# Patient Record
Sex: FEMALE | Race: WHITE | URBAN - METROPOLITAN AREA
[De-identification: names, ages, dates, MRNs, and addresses within clinical notes are randomized per-mention and may not be internally consistent; named-entity substitution may affect disease eponyms.]

---

## 2020-03-12 ENCOUNTER — OFFICE VISIT (OUTPATIENT)
Dept: FAMILY MEDICINE CLINIC | Facility: CLINIC | Age: 30
End: 2020-03-12
Payer: COMMERCIAL

## 2020-03-12 VITALS
RESPIRATION RATE: 16 BRPM | HEIGHT: 66 IN | WEIGHT: 141 LBS | BODY MASS INDEX: 22.66 KG/M2 | SYSTOLIC BLOOD PRESSURE: 122 MMHG | HEART RATE: 89 BPM | OXYGEN SATURATION: 100 % | DIASTOLIC BLOOD PRESSURE: 80 MMHG | TEMPERATURE: 98 F

## 2020-03-12 DIAGNOSIS — Z13.220 LIPID SCREENING: ICD-10-CM

## 2020-03-12 DIAGNOSIS — Z13.1 DIABETES MELLITUS SCREENING: ICD-10-CM

## 2020-03-12 DIAGNOSIS — Z13.0 SCREENING FOR DEFICIENCY ANEMIA: ICD-10-CM

## 2020-03-12 DIAGNOSIS — Z00.00 WELLNESS EXAMINATION: Primary | ICD-10-CM

## 2020-03-12 DIAGNOSIS — Z13.29 THYROID DISORDER SCREEN: ICD-10-CM

## 2020-03-12 PROCEDURE — 99385 PREV VISIT NEW AGE 18-39: CPT | Performed by: FAMILY MEDICINE

## 2020-03-12 NOTE — PROGRESS NOTES
[address]    Chief Complaint:   Patient presents with:  Physical: New Patient/General      HPI:     Leatha Rosales is a 26year old female who presents for an Annual Health Visit. Patient does not have any concerns today.   She bleeding  ENDOCRINE: denies excessive thirst or urination; denies unexpected wt gain or wt loss      EXAM:   /80   Pulse 89   Temp 98 °F (36.7 °C) (Tympanic)   Resp 16   Ht 66\"   Wt 141 lb (64 kg)   LMP 02/24/2020 (Within Days)   SpO2 100%   BMI 22. labs at [facility].   Check records for Tdap booster. Return to clinic if any concern. The patient indicates understanding of these issues and agrees to the plan. Problem List:  There is no problem list on file for this patient.       Vasiliy Ramirez,

## 2020-03-12 NOTE — PATIENT INSTRUCTIONS
Normal exam today. Will see gynecologist for pap smear. Recommend fasting labs at [facility].   Check records for Tdap booster. Return to clinic if any concern.

## 2020-03-14 LAB
ABSOLUTE BASOPHILS: 18 CELLS/UL (ref 0–200)
ABSOLUTE EOSINOPHILS: 62 CELLS/UL (ref 15–500)
ABSOLUTE LYMPHOCYTES: 1771 CELLS/UL (ref 850–3900)
ABSOLUTE MONOCYTES: 392 CELLS/UL (ref 200–950)
ABSOLUTE NEUTROPHILS: 6657 CELLS/UL (ref 1500–7800)
ALBUMIN/GLOBULIN RATIO: 1.4 (CALC) (ref 1–2.5)
ALBUMIN: 4 G/DL (ref 3.6–5.1)
ALKALINE PHOSPHATASE: 37 U/L (ref 31–125)
ALT: 10 U/L (ref 6–29)
APPEARANCE: CLEAR
AST: 12 U/L (ref 10–30)
BASOPHILS: 0.2 %
BILIRUBIN, TOTAL: 0.5 MG/DL (ref 0.2–1.2)
BILIRUBIN: NEGATIVE
BUN: 10 MG/DL (ref 7–25)
CALCIUM: 9.2 MG/DL (ref 8.6–10.2)
CARBON DIOXIDE: 25 MMOL/L (ref 20–32)
CHLORIDE: 106 MMOL/L (ref 98–110)
CHOL/HDLC RATIO: 1.9 (CALC)
CHOLESTEROL, TOTAL: 155 MG/DL
COLOR: YELLOW
CREATININE: 0.79 MG/DL (ref 0.5–1.1)
EGFR IF AFRICN AM: 116 ML/MIN/1.73M2
EGFR IF NONAFRICN AM: 100 ML/MIN/1.73M2
EOSINOPHILS: 0.7 %
GLOBULIN: 2.8 G/DL (CALC) (ref 1.9–3.7)
GLUCOSE: 79 MG/DL (ref 65–99)
GLUCOSE: NEGATIVE
HDL CHOLESTEROL: 83 MG/DL
HEMATOCRIT: 37.2 % (ref 35–45)
HEMOGLOBIN A1C: 5.3 % OF TOTAL HGB
HEMOGLOBIN: 12.7 G/DL (ref 11.7–15.5)
KETONES: NEGATIVE
LDL-CHOLESTEROL: 55 MG/DL (CALC)
LEUKOCYTE ESTERASE: NEGATIVE
LYMPHOCYTES: 19.9 %
MCH: 29.8 PG (ref 27–33)
MCHC: 34.1 G/DL (ref 32–36)
MCV: 87.3 FL (ref 80–100)
MONOCYTES: 4.4 %
MPV: 9.8 FL (ref 7.5–12.5)
NEUTROPHILS: 74.8 %
NITRITE: NEGATIVE
NON-HDL CHOLESTEROL: 72 MG/DL (CALC)
OCCULT BLOOD: NEGATIVE
PH: 5.5 (ref 5–8)
PLATELET COUNT: 324 THOUSAND/UL (ref 140–400)
POTASSIUM: 5 MMOL/L (ref 3.5–5.3)
PROTEIN, TOTAL: 6.8 G/DL (ref 6.1–8.1)
PROTEIN: NEGATIVE
RDW: 12.8 % (ref 11–15)
RED BLOOD CELL COUNT: 4.26 MILLION/UL (ref 3.8–5.1)
SODIUM: 138 MMOL/L (ref 135–146)
SPECIFIC GRAVITY: 1.02 (ref 1–1.03)
TRIGLYCERIDES: 87 MG/DL
TSH W/REFLEX TO FT4: 0.55 MIU/L
WHITE BLOOD CELL COUNT: 8.9 THOUSAND/UL (ref 3.8–10.8)

## 2020-03-16 ENCOUNTER — TELEPHONE (OUTPATIENT)
Dept: FAMILY MEDICINE CLINIC | Facility: CLINIC | Age: 30
End: 2020-03-16

## 2020-03-16 NOTE — TELEPHONE ENCOUNTER
----- Message from Shalonda Matson MD sent at 3/14/2020 11:38 AM CDT -----  Please inform patient that all her labs are very good. Recommend to continue with healthy diet and exercise.

## 2021-09-08 ENCOUNTER — OFFICE VISIT (OUTPATIENT)
Dept: FAMILY MEDICINE CLINIC | Facility: CLINIC | Age: 31
End: 2021-09-08
Payer: COMMERCIAL

## 2021-09-08 VITALS
OXYGEN SATURATION: 100 % | SYSTOLIC BLOOD PRESSURE: 170 MMHG | RESPIRATION RATE: 16 BRPM | BODY MASS INDEX: 25.45 KG/M2 | WEIGHT: 158.38 LBS | HEART RATE: 105 BPM | DIASTOLIC BLOOD PRESSURE: 92 MMHG | HEIGHT: 66 IN | TEMPERATURE: 98 F

## 2021-09-08 DIAGNOSIS — M25.561 CHRONIC PAIN OF RIGHT KNEE: ICD-10-CM

## 2021-09-08 DIAGNOSIS — G89.29 CHRONIC PAIN OF RIGHT KNEE: ICD-10-CM

## 2021-09-08 DIAGNOSIS — I10 HYPERTENSION, UNSPECIFIED TYPE: Primary | ICD-10-CM

## 2021-09-08 PROCEDURE — 93000 ELECTROCARDIOGRAM COMPLETE: CPT | Performed by: NURSE PRACTITIONER

## 2021-09-08 PROCEDURE — 3077F SYST BP >= 140 MM HG: CPT | Performed by: NURSE PRACTITIONER

## 2021-09-08 PROCEDURE — 99214 OFFICE O/P EST MOD 30 MIN: CPT | Performed by: NURSE PRACTITIONER

## 2021-09-08 PROCEDURE — 3008F BODY MASS INDEX DOCD: CPT | Performed by: NURSE PRACTITIONER

## 2021-09-08 PROCEDURE — 3080F DIAST BP >= 90 MM HG: CPT | Performed by: NURSE PRACTITIONER

## 2021-09-08 RX ORDER — LISINOPRIL 10 MG/1
10 TABLET ORAL DAILY
Qty: 90 TABLET | Refills: 0 | Status: SHIPPED | OUTPATIENT
Start: 2021-09-08 | End: 2021-12-08

## 2021-09-08 NOTE — PATIENT INSTRUCTIONS
Blood pressure:  Check labs today through [facility]  EKG today.   DASH or [employer] dietary changes  Start lisinopril 10mg once a day  Notify the office if any potential of pregnancy arises, medication should be avoided, would need to change therapy      Ri itching, swelling, or severe dizziness. Do not use the medication any more than instructed. This medicine may cause dizziness or fainting, especially after exercising or in hot weather. Be very careful when standing or sitting up quickly.   Your ability t there is to know about it. Your doctor or pharmacist may give you other documents about your medicine. Please talk to them if you have any questions. Always follow their advice. There is a more complete description of this medicine available in [place]. Scan

## 2021-09-08 NOTE — PROGRESS NOTES
[address]  PROGRESS NOTE  Chief Complaint:   Patient presents with:  Blood Pressure  Knee Pain: Right knee. possible knee injury from working out.  cant put weight it for a long period of times       HPI:   This is a 31year old female e Allergies:  No Known Allergies  Current Meds:  Current Outpatient Medications   Medication Sig Dispense Refill   • lisinopril 10 MG Oral Tab Take 1 tablet (10 mg total) by mouth daily.  90 tablet 0      Counseling given: Not Answered         REVIEW OF SYSTE rales/rhonchi/wheezing. HEART:  Regular rate and rhythm, S1 and S2 are normal, no murmurs, rubs or gallops. EXTREMITIES: No edema, no cyanosis, no clubbing, FROM, 2+ dorsalis pedis pulses bilaterally.   Positive intermittent right knee pain with extension Tab; Take 1 tablet (10 mg total) by mouth daily. Chronic pain of right knee  Aggressive conservative treatments at this time, avoid deep squats. May take Tylenol arthritis on days of working as she is on her feet often during the day.   Ice right knee a take the missed dose. Return to your normal dosing schedule. Do not take 2 doses of this medicine at one time. Please tell your doctor and pharmacist about all the medicines you take. Include both prescription and over-the-counter medicines.  Also tell the effects:  · low blood pressure  A few people may have an allergic reactions to this medicine. Symptoms can include difficulty breathing, skin rash, itching, swelling, or severe dizziness. If you notice any of these symptoms, seek medical help quickly.   Ext

## 2021-09-09 ENCOUNTER — TELEPHONE (OUTPATIENT)
Dept: FAMILY MEDICINE CLINIC | Facility: CLINIC | Age: 31
End: 2021-09-09

## 2021-09-09 NOTE — TELEPHONE ENCOUNTER
Called to Bianka Bowden with [facility]. TSH and Free T4 added to labs. Will await results before informing patient of the below.

## 2021-09-09 NOTE — TELEPHONE ENCOUNTER
----- Message from JOI Ortiz sent at 9/9/2021 10:52 AM CDT -----  Please call [facility] and ask were TSH and free T4 is, was also in lab orders yesterday. CBC stable without acute findings. Magnesium normal range.   Metabolic panel stable with exce

## 2021-09-10 LAB
ABSOLUTE BASOPHILS: 27 CELLS/UL (ref 0–200)
ABSOLUTE EOSINOPHILS: 102 CELLS/UL (ref 15–500)
ABSOLUTE LYMPHOCYTES: 2292 CELLS/UL (ref 850–3900)
ABSOLUTE MONOCYTES: 551 CELLS/UL (ref 200–950)
ABSOLUTE NEUTROPHILS: 3828 CELLS/UL (ref 1500–7800)
ALBUMIN/GLOBULIN RATIO: 1.7 (CALC) (ref 1–2.5)
ALBUMIN: 4.7 G/DL (ref 3.6–5.1)
ALKALINE PHOSPHATASE: 55 U/L (ref 31–125)
ALT: 15 U/L (ref 6–29)
AST: 18 U/L (ref 10–30)
BASOPHILS: 0.4 %
BILIRUBIN, TOTAL: 0.5 MG/DL (ref 0.2–1.2)
BUN: 11 MG/DL (ref 7–25)
CALCIUM: 9.6 MG/DL (ref 8.6–10.2)
CARBON DIOXIDE: 29 MMOL/L (ref 20–32)
CHLORIDE: 102 MMOL/L (ref 98–110)
CREATININE: 0.77 MG/DL (ref 0.5–1.1)
EGFR IF AFRICN AM: 119 ML/MIN/1.73M2
EGFR IF NONAFRICN AM: 103 ML/MIN/1.73M2
EOSINOPHILS: 1.5 %
GLOBULIN: 2.7 G/DL (CALC) (ref 1.9–3.7)
GLUCOSE: 47 MG/DL (ref 65–139)
HEMATOCRIT: 39.7 % (ref 35–45)
HEMOGLOBIN: 13.4 G/DL (ref 11.7–15.5)
LYMPHOCYTES: 33.7 %
MAGNESIUM: 2.1 MG/DL (ref 1.5–2.5)
MCH: 29.1 PG (ref 27–33)
MCHC: 33.8 G/DL (ref 32–36)
MCV: 86.3 FL (ref 80–100)
MONOCYTES: 8.1 %
MPV: 9.3 FL (ref 7.5–12.5)
NEUTROPHILS: 56.3 %
PLATELET COUNT: 265 THOUSAND/UL (ref 140–400)
POTASSIUM: 5.1 MMOL/L (ref 3.5–5.3)
PROTEIN, TOTAL: 7.4 G/DL (ref 6.1–8.1)
RDW: 12.6 % (ref 11–15)
RED BLOOD CELL COUNT: 4.6 MILLION/UL (ref 3.8–5.1)
SODIUM: 140 MMOL/L (ref 135–146)
T4, FREE: 1.1 NG/DL (ref 0.8–1.8)
TSH: 0.58 MIU/L
WHITE BLOOD CELL COUNT: 6.8 THOUSAND/UL (ref 3.8–10.8)

## 2021-09-10 NOTE — TELEPHONE ENCOUNTER
See notes attached to TSH and Free T4 labs. Per Rajni Beyer:    \"Normal low level of TSH, normal free T4. Monitor annually at wellness exams. \"    [employer] message sent along with results as below.

## 2021-11-27 DIAGNOSIS — I10 HYPERTENSION, UNSPECIFIED TYPE: ICD-10-CM

## 2021-11-29 NOTE — TELEPHONE ENCOUNTER
Future appt:     Last Appointment with provider:   9/8/2021; Return in about 4 weeks (around 10/6/2021) for blood pressure check.     Last appointment at [facility] [place]:  9/8/2021  CHOLESTEROL, TOTAL (mg/dL)   Date Value   03/13/2020 155     HDL CHOLESTEROL (

## 2021-12-08 RX ORDER — LISINOPRIL 10 MG/1
TABLET ORAL
Qty: 30 TABLET | Refills: 0 | Status: SHIPPED | OUTPATIENT
Start: 2021-12-08 | End: 2022-01-05

## 2021-12-15 ENCOUNTER — OFFICE VISIT (OUTPATIENT)
Dept: FAMILY MEDICINE CLINIC | Facility: CLINIC | Age: 31
End: 2021-12-15
Payer: COMMERCIAL

## 2021-12-15 VITALS
WEIGHT: 158 LBS | HEART RATE: 84 BPM | SYSTOLIC BLOOD PRESSURE: 136 MMHG | HEIGHT: 66 IN | TEMPERATURE: 99 F | RESPIRATION RATE: 14 BRPM | BODY MASS INDEX: 25.39 KG/M2 | OXYGEN SATURATION: 100 % | DIASTOLIC BLOOD PRESSURE: 90 MMHG

## 2021-12-15 DIAGNOSIS — I10 HYPERTENSION, UNSPECIFIED TYPE: Primary | ICD-10-CM

## 2021-12-15 PROCEDURE — 3075F SYST BP GE 130 - 139MM HG: CPT | Performed by: NURSE PRACTITIONER

## 2021-12-15 PROCEDURE — 3008F BODY MASS INDEX DOCD: CPT | Performed by: NURSE PRACTITIONER

## 2021-12-15 PROCEDURE — 99214 OFFICE O/P EST MOD 30 MIN: CPT | Performed by: NURSE PRACTITIONER

## 2021-12-15 PROCEDURE — 3080F DIAST BP >= 90 MM HG: CPT | Performed by: NURSE PRACTITIONER

## 2021-12-15 NOTE — PROGRESS NOTES
[address]  PROGRESS NOTE  Chief Complaint:   Patient presents with: Follow - Up  Hypertension      HPI:   This is a 31year old female hypertension recheck.     Patient seen in September, was started on lisinopril secondary to hypertens blurred vision, double vision or yellow sclerae. INTEGUMENTARY:  Denies rashes, itching, skin lesion, or excessive skin dryness.   CARDIOVASCULAR:  Denies chest pain, chest pressure, chest discomfort, palpitations, edema, dyspnea on exertion or at rest. x 3; affect appropriate, no depressed mood or anxiety      ASSESSMENT AND PLAN:   Niyah Sorenson was seen today for follow - up and hypertension.     Diagnoses and all orders for this visit:    Hypertension, unspecified type  Urine dip today without protein in urin questions, complications, allergies, or worsening or changing symptoms. Patient is to call with any side effects or complications from the treatments as a result of today.      Problem List:  Patient Active Problem List:     Old FB in soft tissue     Hyper

## 2021-12-15 NOTE — PATIENT INSTRUCTIONS
Metabolic panel next week to check potassium and kidney function.   Urine dip today  Continue lisinopril at this time, take at the same time every day  Check your blood pressure once a day 3-4 hours after taking your medication as shown; write the readings

## 2021-12-27 ENCOUNTER — TELEPHONE (OUTPATIENT)
Dept: FAMILY MEDICINE CLINIC | Facility: CLINIC | Age: 31
End: 2021-12-27

## 2021-12-27 NOTE — TELEPHONE ENCOUNTER
----- Message from JOI Reyes sent at 12/27/2021  8:54 AM CST -----  [employer] message sent. Metabolic panel stable. Continue with medication and treatment as outlined.   Follow up in a few weeks as previously recommended, bring your cuff at you

## 2021-12-27 NOTE — PROGRESS NOTES
Mychart message sent. Metabolic panel stable. Continue with medication and treatment as outlined. Follow up in a few weeks as previously recommended, bring your cuff at your next visit.

## 2022-01-05 DIAGNOSIS — I10 HYPERTENSION, UNSPECIFIED TYPE: ICD-10-CM

## 2022-01-05 RX ORDER — LISINOPRIL 10 MG/1
TABLET ORAL
Qty: 90 TABLET | Refills: 0 | Status: SHIPPED | OUTPATIENT
Start: 2022-01-05 | End: 2022-01-19

## 2022-01-05 NOTE — TELEPHONE ENCOUNTER
Future appt:     Last Appointment with provider:   12/15/2021; Return in about 4 weeks (around 1/12/2022) for blood pressure recheck.     Last appointment at [facility] [place]:  12/15/2021  CHOLESTEROL, TOTAL (mg/dL)   Date Value   03/13/2020 155     HDL CHOLEST

## 2022-01-05 NOTE — TELEPHONE ENCOUNTER
Appt scheduled.      Future Appointments   Date Time Provider [facility]   1/19/2022  9:00 AM JOI Flor [facility]

## 2022-01-19 ENCOUNTER — OFFICE VISIT (OUTPATIENT)
Dept: FAMILY MEDICINE CLINIC | Facility: CLINIC | Age: 32
End: 2022-01-19
Payer: COMMERCIAL

## 2022-01-19 VITALS
WEIGHT: 160.38 LBS | HEART RATE: 92 BPM | TEMPERATURE: 98 F | DIASTOLIC BLOOD PRESSURE: 78 MMHG | HEIGHT: 65 IN | SYSTOLIC BLOOD PRESSURE: 134 MMHG | OXYGEN SATURATION: 98 % | BODY MASS INDEX: 26.72 KG/M2 | RESPIRATION RATE: 16 BRPM

## 2022-01-19 DIAGNOSIS — I10 HYPERTENSION, UNSPECIFIED TYPE: ICD-10-CM

## 2022-01-19 PROCEDURE — 3008F BODY MASS INDEX DOCD: CPT | Performed by: NURSE PRACTITIONER

## 2022-01-19 PROCEDURE — 3078F DIAST BP <80 MM HG: CPT | Performed by: NURSE PRACTITIONER

## 2022-01-19 PROCEDURE — 3075F SYST BP GE 130 - 139MM HG: CPT | Performed by: NURSE PRACTITIONER

## 2022-01-19 PROCEDURE — 99213 OFFICE O/P EST LOW 20 MIN: CPT | Performed by: NURSE PRACTITIONER

## 2022-01-19 RX ORDER — LISINOPRIL 10 MG/1
10 TABLET ORAL DAILY
Qty: 90 TABLET | Refills: 1 | Status: SHIPPED | OUTPATIENT
Start: 2022-01-19

## 2022-09-14 DIAGNOSIS — I10 HYPERTENSION, UNSPECIFIED TYPE: ICD-10-CM

## 2022-09-14 NOTE — TELEPHONE ENCOUNTER
Future appt:    Last Appointment with provider:  1/19/2022; Follow up in 6 months with annual physical and fasting lab    Last appointment at [facility] [place]:  Visit date not found  CHOLESTEROL, TOTAL (mg/dL)   Date Value   03/13/2020 155     HDL CHOLESTEROL (mg/dL)   Date Value   03/13/2020 83     LDL-CHOLESTEROL (mg/dL (calc))   Date Value   03/13/2020 55     TRIGLYCERIDES (mg/dL)   Date Value   03/13/2020 87     Lab Results   Component Value Date    A1C 5.3 03/13/2020     Lab Results   Component Value Date    T4F 1.1 09/08/2021    TSH 0.58 09/08/2021    TSHT4 0.55 03/13/2020     Last RF:  1/19/2022    No follow-ups on file.

## 2022-09-16 RX ORDER — LISINOPRIL 10 MG/1
TABLET ORAL
Qty: 90 TABLET | Refills: 0 | OUTPATIENT
Start: 2022-09-16

## 2022-11-04 ENCOUNTER — OFFICE VISIT (OUTPATIENT)
Dept: FAMILY MEDICINE CLINIC | Facility: CLINIC | Age: 32
End: 2022-11-04
Payer: COMMERCIAL

## 2022-11-04 VITALS
RESPIRATION RATE: 16 BRPM | WEIGHT: 157.38 LBS | HEIGHT: 65 IN | SYSTOLIC BLOOD PRESSURE: 130 MMHG | OXYGEN SATURATION: 100 % | BODY MASS INDEX: 26.22 KG/M2 | TEMPERATURE: 98 F | HEART RATE: 88 BPM | DIASTOLIC BLOOD PRESSURE: 84 MMHG

## 2022-11-04 DIAGNOSIS — I10 HYPERTENSION, UNSPECIFIED TYPE: ICD-10-CM

## 2022-11-04 DIAGNOSIS — Z00.00 ENCOUNTER FOR ANNUAL HEALTH EXAMINATION: Primary | ICD-10-CM

## 2022-11-04 PROCEDURE — 99395 PREV VISIT EST AGE 18-39: CPT | Performed by: NURSE PRACTITIONER

## 2022-11-04 PROCEDURE — 3079F DIAST BP 80-89 MM HG: CPT | Performed by: NURSE PRACTITIONER

## 2022-11-04 PROCEDURE — 3075F SYST BP GE 130 - 139MM HG: CPT | Performed by: NURSE PRACTITIONER

## 2022-11-04 PROCEDURE — 3008F BODY MASS INDEX DOCD: CPT | Performed by: NURSE PRACTITIONER

## 2022-11-04 RX ORDER — LISINOPRIL 10 MG/1
10 TABLET ORAL DAILY
Qty: 90 TABLET | Refills: 1 | Status: SHIPPED | OUTPATIENT
Start: 2022-11-04

## 2022-11-04 NOTE — PATIENT INSTRUCTIONS
Fasting labs in the next week. Medication refill sent. Pap due next year  Breast self exams once a month  Stay active as able  Medication check in 6 months.

## 2022-11-10 LAB
ABSOLUTE BASOPHILS: 61 CELLS/UL (ref 0–200)
ABSOLUTE EOSINOPHILS: 262 CELLS/UL (ref 15–500)
ABSOLUTE LYMPHOCYTES: 2074 CELLS/UL (ref 850–3900)
ABSOLUTE MONOCYTES: 372 CELLS/UL (ref 200–950)
ABSOLUTE NEUTROPHILS: 3331 CELLS/UL (ref 1500–7800)
ALBUMIN/GLOBULIN RATIO: 1.7 (CALC) (ref 1–2.5)
ALBUMIN: 4.5 G/DL (ref 3.6–5.1)
ALKALINE PHOSPHATASE: 45 U/L (ref 31–125)
ALT: 16 U/L (ref 6–29)
AST: 19 U/L (ref 10–30)
BASOPHILS: 1 %
BILIRUBIN, TOTAL: 0.6 MG/DL (ref 0.2–1.2)
BUN: 12 MG/DL (ref 7–25)
CALCIUM: 9.3 MG/DL (ref 8.6–10.2)
CARBON DIOXIDE: 27 MMOL/L (ref 20–32)
CHLORIDE: 103 MMOL/L (ref 98–110)
CHOL/HDLC RATIO: 1.7 (CALC)
CHOLESTEROL, TOTAL: 144 MG/DL
CREATININE: 0.83 MG/DL (ref 0.5–0.97)
EGFR: 96 ML/MIN/1.73M2
EOSINOPHILS: 4.3 %
GLOBULIN: 2.7 G/DL (CALC) (ref 1.9–3.7)
GLUCOSE: 77 MG/DL (ref 65–99)
HDL CHOLESTEROL: 86 MG/DL
HEMATOCRIT: 38.4 % (ref 35–45)
HEMOGLOBIN: 12.7 G/DL (ref 11.7–15.5)
LDL-CHOLESTEROL: 49 MG/DL (CALC)
LYMPHOCYTES: 34 %
MCH: 29.5 PG (ref 27–33)
MCHC: 33.1 G/DL (ref 32–36)
MCV: 89.1 FL (ref 80–100)
MONOCYTES: 6.1 %
MPV: 9.6 FL (ref 7.5–12.5)
NEUTROPHILS: 54.6 %
NON-HDL CHOLESTEROL: 58 MG/DL (CALC)
PLATELET COUNT: 262 THOUSAND/UL (ref 140–400)
POTASSIUM: 4.3 MMOL/L (ref 3.5–5.3)
PROTEIN, TOTAL: 7.2 G/DL (ref 6.1–8.1)
RDW: 12.1 % (ref 11–15)
RED BLOOD CELL COUNT: 4.31 MILLION/UL (ref 3.8–5.1)
SODIUM: 137 MMOL/L (ref 135–146)
TRIGLYCERIDES: 33 MG/DL
TSH W/REFLEX TO FT4: 0.45 MIU/L
WHITE BLOOD CELL COUNT: 6.1 THOUSAND/UL (ref 3.8–10.8)

## 2023-05-01 DIAGNOSIS — I10 HYPERTENSION, UNSPECIFIED TYPE: ICD-10-CM

## 2023-05-01 NOTE — TELEPHONE ENCOUNTER
Future appt:     Last Appointment with provider:   11/4/2022; Return in about 6 months (around 5/4/2023) for hypertension recheck. Last appointment at [facility]:  11/4/2022  CHOLESTEROL, TOTAL (mg/dL)   Date Value   11/09/2022 144     HDL CHOLESTEROL (mg/dL)   Date Value   11/09/2022 86     LDL-CHOLESTEROL (mg/dL (calc))   Date Value   11/09/2022 49     TRIGLYCERIDES (mg/dL)   Date Value   11/09/2022 33     Lab Results   Component Value Date    A1C 5.3 03/13/2020     Lab Results   Component Value Date    T4F 1.1 09/08/2021    TSH 0.58 09/08/2021    TSHT4 0.45 11/09/2022     Last RF:  11/4/2022    No follow-ups on file.

## 2023-05-10 RX ORDER — LISINOPRIL 10 MG/1
10 TABLET ORAL DAILY
Qty: 90 TABLET | Refills: 0 | Status: SHIPPED | OUTPATIENT
Start: 2023-05-10

## 2023-05-10 NOTE — TELEPHONE ENCOUNTER
Refill sent. Additional refills after office follow up. Follow up with Dr. Carroll Davis as this [place], hasn't seen pcp since 2020.

## 2023-05-10 NOTE — TELEPHONE ENCOUNTER
[employer] message sent has been reviewed by patient and no return call or appt scheduled after 2 messages left.

## 2023-08-07 DIAGNOSIS — I10 HYPERTENSION, UNSPECIFIED TYPE: ICD-10-CM

## 2023-08-07 NOTE — TELEPHONE ENCOUNTER
My chart message sent for appt      Last appt 11/4/22 advised Return in about 6 months (around 5/4/2023) for hypertension recheck. No future appointments.

## 2023-08-10 RX ORDER — LISINOPRIL 10 MG/1
10 TABLET ORAL DAILY
Qty: 90 TABLET | Refills: 0 | OUTPATIENT
Start: 2023-08-10

## 2023-10-19 ENCOUNTER — OFFICE VISIT (OUTPATIENT)
Dept: FAMILY MEDICINE CLINIC | Facility: CLINIC | Age: 33
End: 2023-10-19
Payer: COMMERCIAL

## 2023-10-19 VITALS
TEMPERATURE: 98 F | HEIGHT: 64.5 IN | RESPIRATION RATE: 16 BRPM | BODY MASS INDEX: 27.15 KG/M2 | OXYGEN SATURATION: 99 % | DIASTOLIC BLOOD PRESSURE: 106 MMHG | WEIGHT: 161 LBS | SYSTOLIC BLOOD PRESSURE: 154 MMHG | HEART RATE: 110 BPM

## 2023-10-19 DIAGNOSIS — I10 BENIGN ESSENTIAL HYPERTENSION: Primary | ICD-10-CM

## 2023-10-19 PROCEDURE — 99203 OFFICE O/P NEW LOW 30 MIN: CPT | Performed by: FAMILY MEDICINE

## 2023-10-19 PROCEDURE — 3077F SYST BP >= 140 MM HG: CPT | Performed by: FAMILY MEDICINE

## 2023-10-19 PROCEDURE — 3080F DIAST BP >= 90 MM HG: CPT | Performed by: FAMILY MEDICINE

## 2023-10-19 PROCEDURE — 3008F BODY MASS INDEX DOCD: CPT | Performed by: FAMILY MEDICINE

## 2023-10-19 RX ORDER — LISINOPRIL 10 MG/1
10 TABLET ORAL DAILY
Qty: 90 TABLET | Refills: 1 | Status: SHIPPED | OUTPATIENT
Start: 2023-10-19

## 2023-10-26 NOTE — TELEPHONE ENCOUNTER
Left another message for patient to return call to schedule an appt. Topical Clindamycin Counseling: Patient counseled that this medication may cause skin irritation or allergic reactions.  In the event of skin irritation, the patient was advised to reduce the amount of the drug applied or use it less frequently.   The patient verbalized understanding of the proper use and possible adverse effects of clindamycin.  All of the patient's questions and concerns were addressed.

## 2023-10-27 LAB
ABSOLUTE BASOPHILS: 40 CELLS/UL (ref 0–200)
ABSOLUTE EOSINOPHILS: 192 CELLS/UL (ref 15–500)
ABSOLUTE LYMPHOCYTES: 2328 CELLS/UL (ref 850–3900)
ABSOLUTE MONOCYTES: 456 CELLS/UL (ref 200–950)
ABSOLUTE NEUTROPHILS: 4984 CELLS/UL (ref 1500–7800)
ALBUMIN/GLOBULIN RATIO: 1.5 (CALC) (ref 1–2.5)
ALBUMIN: 4.4 G/DL (ref 3.6–5.1)
ALKALINE PHOSPHATASE: 50 U/L (ref 31–125)
ALT: 17 U/L (ref 6–29)
AST: 22 U/L (ref 10–30)
BASOPHILS: 0.5 %
BILIRUBIN, TOTAL: 0.6 MG/DL (ref 0.2–1.2)
BUN: 10 MG/DL (ref 7–25)
CALCIUM: 9.4 MG/DL (ref 8.6–10.2)
CARBON DIOXIDE: 27 MMOL/L (ref 20–32)
CHLORIDE: 104 MMOL/L (ref 98–110)
CHOL/HDLC RATIO: 1.8 (CALC)
CHOLESTEROL, TOTAL: 149 MG/DL
CREATININE: 0.75 MG/DL (ref 0.5–0.97)
EGFR: 108 ML/MIN/1.73M2
EOSINOPHILS: 2.4 %
GLOBULIN: 3 G/DL (CALC) (ref 1.9–3.7)
GLUCOSE: 77 MG/DL (ref 65–99)
HDL CHOLESTEROL: 85 MG/DL
HEMATOCRIT: 40.7 % (ref 35–45)
HEMOGLOBIN: 13.4 G/DL (ref 11.7–15.5)
LDL-CHOLESTEROL: 50 MG/DL (CALC)
LYMPHOCYTES: 29.1 %
MCH: 29.6 PG (ref 27–33)
MCHC: 32.9 G/DL (ref 32–36)
MCV: 89.8 FL (ref 80–100)
MONOCYTES: 5.7 %
MPV: 9.5 FL (ref 7.5–12.5)
NEUTROPHILS: 62.3 %
NON-HDL CHOLESTEROL: 64 MG/DL (CALC)
PLATELET COUNT: 321 THOUSAND/UL (ref 140–400)
POTASSIUM: 4.4 MMOL/L (ref 3.5–5.3)
PROTEIN, TOTAL: 7.4 G/DL (ref 6.1–8.1)
RDW: 12.5 % (ref 11–15)
RED BLOOD CELL COUNT: 4.53 MILLION/UL (ref 3.8–5.1)
SODIUM: 138 MMOL/L (ref 135–146)
TRIGLYCERIDES: 62 MG/DL
TSH W/REFLEX TO FT4: 0.97 MIU/L
WHITE BLOOD CELL COUNT: 8 THOUSAND/UL (ref 3.8–10.8)

## 2023-11-30 ENCOUNTER — OFFICE VISIT (OUTPATIENT)
Dept: OBGYN CLINIC | Facility: CLINIC | Age: 33
End: 2023-11-30
Payer: COMMERCIAL

## 2023-11-30 VITALS
HEART RATE: 132 BPM | HEIGHT: 64.5 IN | BODY MASS INDEX: 26.98 KG/M2 | WEIGHT: 160 LBS | SYSTOLIC BLOOD PRESSURE: 148 MMHG | DIASTOLIC BLOOD PRESSURE: 86 MMHG

## 2023-11-30 DIAGNOSIS — Z12.4 SCREENING FOR CERVICAL CANCER: ICD-10-CM

## 2023-11-30 DIAGNOSIS — Z01.419 WELL WOMAN EXAM WITH ROUTINE GYNECOLOGICAL EXAM: Primary | ICD-10-CM

## 2023-11-30 DIAGNOSIS — Z11.51 SCREENING FOR HUMAN PAPILLOMAVIRUS (HPV): ICD-10-CM

## 2023-11-30 PROCEDURE — 3077F SYST BP >= 140 MM HG: CPT | Performed by: NURSE PRACTITIONER

## 2023-11-30 PROCEDURE — 87624 HPV HI-RISK TYP POOLED RSLT: CPT | Performed by: NURSE PRACTITIONER

## 2023-11-30 PROCEDURE — 3079F DIAST BP 80-89 MM HG: CPT | Performed by: NURSE PRACTITIONER

## 2023-11-30 PROCEDURE — 3008F BODY MASS INDEX DOCD: CPT | Performed by: NURSE PRACTITIONER

## 2023-11-30 PROCEDURE — 99395 PREV VISIT EST AGE 18-39: CPT | Performed by: NURSE PRACTITIONER

## 2023-12-01 LAB — HPV I/H RISK 1 DNA SPEC QL NAA+PROBE: NEGATIVE

## 2023-12-06 LAB
.: NORMAL
.: NORMAL

## 2024-04-14 DIAGNOSIS — I10 BENIGN ESSENTIAL HYPERTENSION: ICD-10-CM

## 2024-04-16 RX ORDER — LISINOPRIL 10 MG/1
10 TABLET ORAL DAILY
Qty: 90 TABLET | Refills: 1 | Status: SHIPPED | OUTPATIENT
Start: 2024-04-16

## 2024-04-16 NOTE — TELEPHONE ENCOUNTER
Refill Failed Protocol:     Pt requesting refill of   Requested Prescriptions     Pending Prescriptions Disp Refills    LISINOPRIL 10 MG Oral Tab [Pharmacy Med Name: LISINOPRIL 10 MG TABLET] 90 tablet 1     Sig: TAKE 1 TABLET BY MOUTH EVERY DAY     Last Time Medication was Filled:  10/19/2023    Last Office Visit with Provider: 10/19/2023    Unable to approve refill,     Hypertension Medications Protocol Yipeos1904/14/2024 07:39 AM   Protocol Details Last BP reading less than 140/90    CMP or BMP in past 12 months    In person appointment or virtual visit in the past 12 mos or appointment in next 3 mos    EGFRCR or GFRNAA > 50    EGFRCR or GFRAA > 50           Appt. scheduled on 5/3/2024

## 2024-04-17 NOTE — TELEPHONE ENCOUNTER
Rx refill sent.    Please have pt complete fasting labs prior to her 5/3/24 appt, if able. Thanks.

## 2024-04-19 NOTE — TELEPHONE ENCOUNTER
LMOM to return call to the office. Provided pt office phone [phone] along with office hours.

## 2024-07-23 ENCOUNTER — TELEPHONE (OUTPATIENT)
Dept: CASE MANAGEMENT | Age: 34
End: 2024-07-23

## 2024-10-19 DIAGNOSIS — I10 BENIGN ESSENTIAL HYPERTENSION: ICD-10-CM

## 2024-10-24 RX ORDER — LISINOPRIL 10 MG/1
10 TABLET ORAL DAILY
Qty: 30 TABLET | Refills: 0 | OUTPATIENT
Start: 2024-10-24

## 2024-10-24 NOTE — TELEPHONE ENCOUNTER
No refill authorized.    Not seen in >1 year.    Make annual physical appt/HTN f-u appt.    Thanks.

## 2024-10-24 NOTE — TELEPHONE ENCOUNTER
Requesting   Name from pharmacy: LISINOPRIL 10 MG TABLET          Will file in chart as: LISINOPRIL 10 MG Oral Tab    Sig: TAKE 1 TABLET BY MOUTH EVERY DAY     LOV:   10/19/2023 Dr Pimentel   1. Benign essential hypertension  - BP elevated, also on rpt check today  - 11/2022 BMP with normal lytes and Scr, but due for recheck (ordered labs for [facility])  - cont Lisinopril 10 mg daily  - d/w pt a healthy, low-sodium diet, regular exercise, and wt loss  - asked pt to keep BP log for the next 7-10 days and  send in [employer] message with readings  - RTC in 6 months for f-u and annual physical     - lisinopril 10 MG Oral Tab; Take 1 tablet (10 mg total) by mouth daily.  Dispense: 90 tablet; Refill: 1  - COMP METABOLIC PANEL [53806] [Q]  - LIPID PANEL [7600] [Q]  - TSH W REFLEX TO FREE T4 [15072][Q]  - CBC [6399] [Q]    RTC: 6 months     Last Relevant Labs: labs from 4/16/2024 not complete    Filled: #90 with 1 refills on 4/16/2024    No future appointments.

## 2024-11-18 ENCOUNTER — OFFICE VISIT (OUTPATIENT)
Dept: FAMILY MEDICINE CLINIC | Facility: CLINIC | Age: 34
End: 2024-11-18
Payer: COMMERCIAL

## 2024-11-18 VITALS
TEMPERATURE: 98 F | BODY MASS INDEX: 27.45 KG/M2 | RESPIRATION RATE: 18 BRPM | WEIGHT: 160.81 LBS | HEIGHT: 64.37 IN | OXYGEN SATURATION: 100 % | DIASTOLIC BLOOD PRESSURE: 100 MMHG | SYSTOLIC BLOOD PRESSURE: 140 MMHG | HEART RATE: 108 BPM

## 2024-11-18 DIAGNOSIS — Z13.29 SCREENING FOR ENDOCRINE, NUTRITIONAL, METABOLIC AND IMMUNITY DISORDER: ICD-10-CM

## 2024-11-18 DIAGNOSIS — Z13.228 SCREENING FOR ENDOCRINE, NUTRITIONAL, METABOLIC AND IMMUNITY DISORDER: ICD-10-CM

## 2024-11-18 DIAGNOSIS — Z13.21 SCREENING FOR ENDOCRINE, NUTRITIONAL, METABOLIC AND IMMUNITY DISORDER: ICD-10-CM

## 2024-11-18 DIAGNOSIS — F41.1 GENERALIZED ANXIETY DISORDER: ICD-10-CM

## 2024-11-18 DIAGNOSIS — Z00.00 ANNUAL PHYSICAL EXAM: Primary | ICD-10-CM

## 2024-11-18 DIAGNOSIS — I10 BENIGN ESSENTIAL HYPERTENSION: ICD-10-CM

## 2024-11-18 DIAGNOSIS — Z23 NEED FOR VACCINATION: ICD-10-CM

## 2024-11-18 DIAGNOSIS — Z13.0 SCREENING FOR ENDOCRINE, NUTRITIONAL, METABOLIC AND IMMUNITY DISORDER: ICD-10-CM

## 2024-11-18 PROCEDURE — 99395 PREV VISIT EST AGE 18-39: CPT | Performed by: FAMILY MEDICINE

## 2024-11-18 PROCEDURE — 3008F BODY MASS INDEX DOCD: CPT | Performed by: FAMILY MEDICINE

## 2024-11-18 PROCEDURE — 3077F SYST BP >= 140 MM HG: CPT | Performed by: FAMILY MEDICINE

## 2024-11-18 PROCEDURE — 3080F DIAST BP >= 90 MM HG: CPT | Performed by: FAMILY MEDICINE

## 2024-11-18 RX ORDER — LISINOPRIL 10 MG/1
10 TABLET ORAL DAILY
Qty: 90 TABLET | Refills: 1 | Status: SHIPPED | OUTPATIENT
Start: 2024-11-18

## 2024-11-18 RX ORDER — HYDROXYZINE HYDROCHLORIDE 25 MG/1
25 TABLET, FILM COATED ORAL EVERY 6 HOURS PRN
Qty: 30 TABLET | Refills: 1 | Status: SHIPPED | OUTPATIENT
Start: 2024-11-18

## 2024-11-24 NOTE — PROGRESS NOTES
Chief Complaint   Patient presents with    Physical     Annual exam     Shoulder Pain     Left side x 1 week        HPI:   Alba Landin is a 33 year old female who presents for a complete physical without gyne exam.   Patient feels well, dental visit up to date, no hearing problem.  Vaccinations declines Flu    LMP: 11/11/24  Sexual activity:monogamy   Contraception: not discussed  Exercise:  active at work .  Diet:  no fast food    Wt Readings from Last 3 Encounters:   11/18/24 160 lb 12.8 oz (72.9 kg)   11/30/23 160 lb (72.6 kg)   10/19/23 161 lb (73 kg)      BP Readings from Last 3 Encounters:   11/18/24 (!) 140/100   11/30/23 148/86   10/19/23 (!) 154/106     Patient's last menstrual period was 11/11/2024 (exact date).     Annual physical:  Overall pt states she feels well. She works in [employer].     LMP: 11/11/24  Sexually Active: not discussed   Last pap smear: 11/2023 w/ Gyn (Margot Potter), was normal  Last mammogram: n/a  Last Colon Ca screening: n/a  Last DEXA Scan: n/a    Exercise:  active at work  Diet:  no fast food    HTN f-u:  she states she has some lingering pain from a recent dental procedure.  Otherwise she is tolerating her medication well, in on Lisinopril daily and has not missed any doses. She has no HA, no CP, palpitiations, no leg swelling.    Previous HPI from 10/2023: She was noted to have elevated BP with her Gyn office, then started on medication about 1 year ago.  Currently on Lisinopril 10 mg.  Normally at home she runs in the 120s.  Today she feels well- no HA, no CP, palpitations, no leg swelling.  She does feel anxious, has increased life stressors,  Fhx: mother and maternal GM with HTN. Exercise: cardio, weight lifting, but not going regularly lately.  Diet: regular, eats some fast food, but not daily.  EtOH infrequent and is not a smoker.     Anxiety: pt has had increased stress and anciety from a recent dental procedure.  She had a lot of work done on the left  side of her mouthm felt herself tense up on the left side during the procedure, and is still having pain on that left side.  Procedure was in late Oct 2024. Has been taking Ibuprofen and Aleve with little relief.  She also has felt anxious, even when sitting watching a movie with her kids.  She does not drink any caffeine. Has no difficulty sleeping and has a good appetite.  No SI and no HI.  She prefers to talk to a therapist through her EAP and doesn't want daily medication.                    Current Outpatient Medications   Medication Sig Dispense Refill    lisinopril 10 MG Oral Tab Take 1 tablet (10 mg total) by mouth daily. 90 tablet 1    hydrOXYzine 25 MG Oral Tab Take 1 tablet (25 mg total) by mouth every 6 (six) hours as needed for Anxiety. 30 tablet 1      Past Medical History:    Essential hypertension    Preeclampsia (HCC)      Past Surgical History:   Procedure Laterality Date    Pope teeth removed Bilateral 2019      Family History   Problem Relation Age of Onset    Diabetes Mother     Hypertension Mother     Cancer Mother         throat cancer; smoker    Diabetes Maternal Grandmother     Hypertension Maternal Grandmother     Heart Disease Maternal Grandfather     Colon Cancer Paternal Grandfather     Schizophrenia Brother       Social History:  Social History     Socioeconomic History    Marital status: Single   Tobacco Use    Smoking status: Never    Smokeless tobacco: Never   Vaping Use    Vaping status: Never Used   Substance and Sexual Activity    Alcohol use: Yes     Alcohol/week: 1.0 standard drink of alcohol     Types: 1 Standard drinks or equivalent per week     Comment: occasionally    Drug use: Never    Sexual activity: Yes     Partners: Male     Comment: No birthcontrol   Other Topics Concern    Caffeine Concern Yes    Exercise Yes    Seat Belt Yes   Social History Narrative    Single    3 kids    Work- [employer]       Allergies:  Allergies[1]     REVIEW OF SYSTEMS:     Review  of Systems   Constitutional:  Negative for fatigue.   Cardiovascular:  Negative for chest pain, palpitations and leg swelling.   Gastrointestinal:  Negative for abdominal pain, constipation, diarrhea, nausea and vomiting.   Genitourinary:  Negative for dysuria and menstrual problem.   Musculoskeletal:  Positive for myalgias.   Psychiatric/Behavioral:  Negative for agitation, dysphoric mood, self-injury, sleep disturbance and suicidal ideas. The patient is nervous/anxious.         EXAM:   BP (!) 140/100 (BP Location: Left arm, Patient Position: Sitting, Cuff Size: adult)   Pulse 108   Temp 98.2 °F (36.8 °C) (Temporal)   Resp 18   Ht 5' 4.37\" (1.635 m)   Wt 160 lb 12.8 oz (72.9 kg)   LMP 11/11/2024 (Exact Date)   SpO2 100%   BMI 27.28 kg/m²    GENERAL: WD/WN in no acute distress.   HEENT: PERRLA and EOMI.  OP moist no lesions.TM WNL, marsha.Normal ears canals bilaterally.  Neck is supple, with no cervical LAD or thyroid abnormalities.  LUNGS: are clear to auscultation bilaterally, with no wheeze, rhonchi, or rales.   HEART: is RRR.  S1, S2, with no murmurs,clicks, gallops  BREAST:deferred  ABDOMEN: is soft,NBS, NT/ND with no HSM.  No rebound or guarding. No CVA tenderness, no hernias.   EXAM: deferred  RECTAL EXAM: deferred  NEURO: Cranial nerves II-XII normal,no focal abnormalities, and reflexes coordination and gait normal and symmetric.Sensation intact.  EXTREMETIES: are symmetric with no cyanosis, clubbing, or edema.  MS: Normal muscles tones, no joints abnormalities. Has + spasm along left upper back and along left side of neck, near SCM when turning head to the right side.  SKIN: Normal color, turgor, no lesions, rashes or wounds.  PSYCH: normal affect and mood.    ASSESSMENT AND PLAN:     33 year old female with     1. Annual physical exam  Routine labs ordered today, await results. Counseled pt on healthy lifestyle changes. Vaccines today: declines Flu . Contraception: not discussed .    Last pap smear:  11/2023 w/ Gyn (Margot Potter), was normal       - CBC With Differential With Platelet  - TSH W Reflex To Free T4    2. Benign essential hypertension  - BP elevated, also on rpt check today, due to increased anxiety  - 11/2022 BMP with normal lytes and Scr, but due for recheck (ordered labs for [facility])  - cont Lisinopril 10 mg daily  - d/w pt a healthy, low-sodium diet, regular exercise, and wt loss  - RTC in 6-8 months for f-u and annual physical    - Comp Metabolic Panel  - Lipid Panel  - lisinopril 10 MG Oral Tab; Take 1 tablet (10 mg total) by mouth daily.  Dispense: 90 tablet; Refill: 1    3. Generalized anxiety disorder  - new onset  - prefers to utilize therapist through EAP through work  - START Hydroxyzine prn, R/B/SE of new med d/w pt  - f-u in 6-8 wks, or sooner prn    - hydrOXYzine 25 MG Oral Tab; Take 1 tablet (25 mg total) by mouth every 6 (six) hours as needed for Anxiety.  Dispense: 30 tablet; Refill: 1    4. Screening for endocrine, nutritional, metabolic and immunity disorder    - CBC With Differential With Platelet  - TSH W Reflex To Free T4    5. Need for vaccination    - INFLUENZA REFUSED EE        Pt's was recommended low fat diet and aerobic exercise 30 minutes three times weekly.   Health maintenance.   Osteoporosis prevention addressed  Recommended whole food type diet, eliminate processed food/low sugar and low sat fat diet      The patient indicates understanding of these issues and agrees to the plan.    Return for anxiety f-u in 6-8 wks, or sooner if needed; pls send [facility] message with BP readings in 7-10 days.       [1] No Known Allergies

## 2024-11-26 DIAGNOSIS — I10 BENIGN ESSENTIAL HYPERTENSION: Primary | ICD-10-CM

## 2024-11-26 LAB
ABSOLUTE BASOPHILS: 59 CELLS/UL (ref 0–200)
ABSOLUTE EOSINOPHILS: 383 CELLS/UL (ref 15–500)
ABSOLUTE LYMPHOCYTES: 2521 CELLS/UL (ref 850–3900)
ABSOLUTE MONOCYTES: 304 CELLS/UL (ref 200–950)
ABSOLUTE NEUTROPHILS: 3333 CELLS/UL (ref 1500–7800)
ALBUMIN/GLOBULIN RATIO: 1.8 (CALC) (ref 1–2.5)
ALBUMIN: 4.9 G/DL (ref 3.6–5.1)
ALKALINE PHOSPHATASE: 48 U/L (ref 31–125)
ALT: 14 U/L (ref 6–29)
AST: 20 U/L (ref 10–30)
BASOPHILS: 0.9 %
BILIRUBIN, TOTAL: 0.4 MG/DL (ref 0.2–1.2)
BUN: 10 MG/DL (ref 7–25)
CALCIUM: 9.9 MG/DL (ref 8.6–10.2)
CARBON DIOXIDE: 26 MMOL/L (ref 20–32)
CHLORIDE: 103 MMOL/L (ref 98–110)
CHOL/HDLC RATIO: 2.1 (CALC)
CHOLESTEROL, TOTAL: 169 MG/DL
CREATININE: 0.87 MG/DL (ref 0.5–0.97)
EGFR: 90 ML/MIN/1.73M2
EOSINOPHILS: 5.8 %
GLOBULIN: 2.8 G/DL (CALC) (ref 1.9–3.7)
GLUCOSE: 94 MG/DL (ref 65–99)
HDL CHOLESTEROL: 82 MG/DL
HEMATOCRIT: 44.8 % (ref 35–45)
HEMOGLOBIN: 14.6 G/DL (ref 11.7–15.5)
LDL-CHOLESTEROL: 74 MG/DL (CALC)
LYMPHOCYTES: 38.2 %
MCH: 29.6 PG (ref 27–33)
MCHC: 32.6 G/DL (ref 32–36)
MCV: 90.9 FL (ref 80–100)
MONOCYTES: 4.6 %
MPV: 9.5 FL (ref 7.5–12.5)
NEUTROPHILS: 50.5 %
NON-HDL CHOLESTEROL: 87 MG/DL (CALC)
PLATELET COUNT: 309 THOUSAND/UL (ref 140–400)
POTASSIUM: 4.4 MMOL/L (ref 3.5–5.3)
PROTEIN, TOTAL: 7.7 G/DL (ref 6.1–8.1)
RDW: 12.2 % (ref 11–15)
RED BLOOD CELL COUNT: 4.93 MILLION/UL (ref 3.8–5.1)
SODIUM: 136 MMOL/L (ref 135–146)
TRIGLYCERIDES: 52 MG/DL
TSH W/REFLEX TO FT4: 0.67 MIU/L
WHITE BLOOD CELL COUNT: 6.6 THOUSAND/UL (ref 3.8–10.8)

## 2025-05-29 DIAGNOSIS — I10 BENIGN ESSENTIAL HYPERTENSION: ICD-10-CM

## 2025-05-29 RX ORDER — LISINOPRIL 10 MG/1
10 TABLET ORAL DAILY
Qty: 30 TABLET | Refills: 0 | Status: SHIPPED | OUTPATIENT
Start: 2025-05-29

## 2025-05-29 NOTE — TELEPHONE ENCOUNTER
Refill Request Action taken:  [x] Request routed to provider for review - [ ] Blood Pressure out of range    Refill(s) Requested:   Requested Prescriptions     Pending Prescriptions Disp Refills    lisinopril 10 MG Oral Tab [Pharmacy Med Name: LISINOPRIL 10 MG TABLET] 90 tablet 0     Sig: TAKE 1 TABLET BY MOUTH EVERY DAY     Medication Last Prescribed:  11/18/24 90 days 1 refill      Has dose or medication been changed    since last prescription? *Review notes*    []  Yes       [x]  No     Last office visit: 11/18/2024 (in-office)  Visit date not found (virtual visit)     Relevant details from LOV note: Benign essential hypertension  - BP elevated, also on rpt check today, due to increased anxiety  - 11/2022 BMP with normal lytes and Scr, but due for recheck (ordered labs for [facility])  - cont Lisinopril 10 mg daily  - d/w pt a healthy, low-sodium diet, regular exercise, and wt loss  - RTC in 6-8 months for f-u and annual physical     Relevant lab results: N/A    Patient was asked to follow-up in: RTC in 6-8 months for f-u and annual physical    Appointment due: July 2025    Future Appointments: Visit date not found

## 2025-05-29 NOTE — TELEPHONE ENCOUNTER
30-Day Rx filled since not seen since 11/2024 and overdue for 6 month f-u.    Please schedule appt.     Thanks.

## 2025-07-07 ENCOUNTER — TELEPHONE (OUTPATIENT)
Dept: FAMILY MEDICINE CLINIC | Facility: CLINIC | Age: 35
End: 2025-07-07

## 2025-07-08 ENCOUNTER — TELEPHONE (OUTPATIENT)
Dept: FAMILY MEDICINE CLINIC | Facility: CLINIC | Age: 35
End: 2025-07-08

## 2025-07-08 DIAGNOSIS — I10 BENIGN ESSENTIAL HYPERTENSION: ICD-10-CM

## 2025-07-11 RX ORDER — LISINOPRIL 10 MG/1
10 TABLET ORAL DAILY
Qty: 30 TABLET | Refills: 0 | Status: SHIPPED | OUTPATIENT
Start: 2025-07-11

## 2025-07-11 NOTE — TELEPHONE ENCOUNTER
Please review.  Protocol failed/has no protocol    Last Office Visit: 11/18/2024    [facility] message sent to patient to schedule an office visit with Primary Care Provider.   Will also route to Call Center to make a phone attempt.